# Patient Record
Sex: FEMALE | Race: WHITE | Employment: UNEMPLOYED
[De-identification: names, ages, dates, MRNs, and addresses within clinical notes are randomized per-mention and may not be internally consistent; named-entity substitution may affect disease eponyms.]

---

## 2022-07-20 ENCOUNTER — OFFICE VISIT (OUTPATIENT)
Dept: FAMILY MEDICINE CLINIC | Facility: CLINIC | Age: 67
End: 2022-07-20
Payer: COMMERCIAL

## 2022-07-20 VITALS
SYSTOLIC BLOOD PRESSURE: 118 MMHG | HEART RATE: 98 BPM | WEIGHT: 173 LBS | OXYGEN SATURATION: 98 % | DIASTOLIC BLOOD PRESSURE: 78 MMHG | HEIGHT: 65 IN | BODY MASS INDEX: 28.82 KG/M2

## 2022-07-20 DIAGNOSIS — Z13.6 SCREENING FOR HEART DISEASE: ICD-10-CM

## 2022-07-20 DIAGNOSIS — Z12.31 ENCOUNTER FOR SCREENING MAMMOGRAM FOR MALIGNANT NEOPLASM OF BREAST: ICD-10-CM

## 2022-07-20 DIAGNOSIS — H61.21 IMPACTED CERUMEN OF RIGHT EAR: ICD-10-CM

## 2022-07-20 DIAGNOSIS — Z85.89 HISTORY OF SQUAMOUS CELL CARCINOMA: ICD-10-CM

## 2022-07-20 DIAGNOSIS — Z76.89 ENCOUNTER TO ESTABLISH CARE: ICD-10-CM

## 2022-07-20 DIAGNOSIS — Z00.00 ANNUAL PHYSICAL EXAM: ICD-10-CM

## 2022-07-20 DIAGNOSIS — Z85.828 HISTORY OF BASAL CELL CARCINOMA OF SKIN: Primary | ICD-10-CM

## 2022-07-20 PROCEDURE — 99203 OFFICE O/P NEW LOW 30 MIN: CPT | Performed by: FAMILY MEDICINE

## 2022-07-20 ASSESSMENT — ENCOUNTER SYMPTOMS
CONSTIPATION: 0
EYE PAIN: 0
COUGH: 0
WHEEZING: 0
NAUSEA: 0
BLOOD IN STOOL: 0
SINUS PRESSURE: 0
RHINORRHEA: 0
VOMITING: 0
COLOR CHANGE: 0
EYE ITCHING: 0
BACK PAIN: 0
EYE DISCHARGE: 0
SORE THROAT: 0
FACIAL SWELLING: 0
EYE REDNESS: 0
SINUS PAIN: 0
CHEST TIGHTNESS: 0
ABDOMINAL DISTENTION: 0
DIARRHEA: 0
SHORTNESS OF BREATH: 0
STRIDOR: 0
ABDOMINAL PAIN: 0

## 2022-07-20 ASSESSMENT — PATIENT HEALTH QUESTIONNAIRE - PHQ9
SUM OF ALL RESPONSES TO PHQ QUESTIONS 1-9: 0
SUM OF ALL RESPONSES TO PHQ QUESTIONS 1-9: 0
SUM OF ALL RESPONSES TO PHQ9 QUESTIONS 1 & 2: 0
SUM OF ALL RESPONSES TO PHQ QUESTIONS 1-9: 0
1. LITTLE INTEREST OR PLEASURE IN DOING THINGS: 0
2. FEELING DOWN, DEPRESSED OR HOPELESS: 0
SUM OF ALL RESPONSES TO PHQ QUESTIONS 1-9: 0

## 2022-07-20 ASSESSMENT — LIFESTYLE VARIABLES
HOW MANY STANDARD DRINKS CONTAINING ALCOHOL DO YOU HAVE ON A TYPICAL DAY: 1 OR 2
HOW OFTEN DO YOU HAVE A DRINK CONTAINING ALCOHOL: MONTHLY OR LESS

## 2022-07-28 DIAGNOSIS — Z13.6 SCREENING FOR HEART DISEASE: ICD-10-CM

## 2022-07-28 DIAGNOSIS — Z00.00 ANNUAL PHYSICAL EXAM: ICD-10-CM

## 2022-07-28 LAB
ALBUMIN SERPL-MCNC: 3.8 G/DL (ref 3.2–4.6)
ALBUMIN/GLOB SERPL: 1.2 {RATIO} (ref 1.2–3.5)
ALP SERPL-CCNC: 77 U/L (ref 50–136)
ALT SERPL-CCNC: 48 U/L (ref 12–65)
ANION GAP SERPL CALC-SCNC: 7 MMOL/L (ref 7–16)
AST SERPL-CCNC: 34 U/L (ref 15–37)
BASOPHILS # BLD: 0.1 K/UL (ref 0–0.2)
BASOPHILS NFR BLD: 1 % (ref 0–2)
BILIRUB SERPL-MCNC: 0.6 MG/DL (ref 0.2–1.1)
BUN SERPL-MCNC: 11 MG/DL (ref 8–23)
CALCIUM SERPL-MCNC: 9.1 MG/DL (ref 8.3–10.4)
CHLORIDE SERPL-SCNC: 107 MMOL/L (ref 98–107)
CHOLEST SERPL-MCNC: 234 MG/DL
CO2 SERPL-SCNC: 27 MMOL/L (ref 21–32)
CREAT SERPL-MCNC: 0.7 MG/DL (ref 0.6–1)
DIFFERENTIAL METHOD BLD: ABNORMAL
EOSINOPHIL # BLD: 0.2 K/UL (ref 0–0.8)
EOSINOPHIL NFR BLD: 3 % (ref 0.5–7.8)
ERYTHROCYTE [DISTWIDTH] IN BLOOD BY AUTOMATED COUNT: 11.7 % (ref 11.9–14.6)
GLOBULIN SER CALC-MCNC: 3.2 G/DL (ref 2.3–3.5)
GLUCOSE SERPL-MCNC: 104 MG/DL (ref 65–100)
HCT VFR BLD AUTO: 39 % (ref 35.8–46.3)
HDLC SERPL-MCNC: 34 MG/DL (ref 40–60)
HDLC SERPL: 6.9 {RATIO}
HGB BLD-MCNC: 13.2 G/DL (ref 11.7–15.4)
IMM GRANULOCYTES # BLD AUTO: 0 K/UL (ref 0–0.5)
IMM GRANULOCYTES NFR BLD AUTO: 0 % (ref 0–5)
LDLC SERPL CALC-MCNC: ABNORMAL MG/DL
LYMPHOCYTES # BLD: 2.5 K/UL (ref 0.5–4.6)
LYMPHOCYTES NFR BLD: 42 % (ref 13–44)
MCH RBC QN AUTO: 31.1 PG (ref 26.1–32.9)
MCHC RBC AUTO-ENTMCNC: 33.8 G/DL (ref 31.4–35)
MCV RBC AUTO: 91.8 FL (ref 79.6–97.8)
MONOCYTES # BLD: 0.5 K/UL (ref 0.1–1.3)
MONOCYTES NFR BLD: 8 % (ref 4–12)
NEUTS SEG # BLD: 2.7 K/UL (ref 1.7–8.2)
NEUTS SEG NFR BLD: 46 % (ref 43–78)
NRBC # BLD: 0 K/UL (ref 0–0.2)
PLATELET # BLD AUTO: 313 K/UL (ref 150–450)
PMV BLD AUTO: 9.5 FL (ref 9.4–12.3)
POTASSIUM SERPL-SCNC: 4.1 MMOL/L (ref 3.5–5.1)
PROT SERPL-MCNC: 7 G/DL (ref 6.3–8.2)
RBC # BLD AUTO: 4.25 M/UL (ref 4.05–5.2)
SODIUM SERPL-SCNC: 141 MMOL/L (ref 136–145)
TRIGL SERPL-MCNC: 410 MG/DL (ref 35–150)
TSH, 3RD GENERATION: 1.29 UIU/ML (ref 0.36–3.74)
VLDLC SERPL CALC-MCNC: 82 MG/DL (ref 6–23)
WBC # BLD AUTO: 5.9 K/UL (ref 4.3–11.1)

## 2022-07-29 LAB — LDLC SERPL DIRECT ASSAY-MCNC: 140 MG/DL

## 2022-08-03 ENCOUNTER — OFFICE VISIT (OUTPATIENT)
Dept: FAMILY MEDICINE CLINIC | Facility: CLINIC | Age: 67
End: 2022-08-03
Payer: COMMERCIAL

## 2022-08-03 VITALS
HEIGHT: 65 IN | WEIGHT: 174 LBS | BODY MASS INDEX: 28.99 KG/M2 | SYSTOLIC BLOOD PRESSURE: 130 MMHG | DIASTOLIC BLOOD PRESSURE: 60 MMHG | HEART RATE: 99 BPM | OXYGEN SATURATION: 95 %

## 2022-08-03 DIAGNOSIS — Z00.00 ANNUAL PHYSICAL EXAM: Primary | ICD-10-CM

## 2022-08-03 DIAGNOSIS — Z12.31 ENCOUNTER FOR SCREENING MAMMOGRAM FOR MALIGNANT NEOPLASM OF BREAST: ICD-10-CM

## 2022-08-03 DIAGNOSIS — E78.5 DYSLIPIDEMIA (HIGH LDL; LOW HDL): ICD-10-CM

## 2022-08-03 DIAGNOSIS — R73.01 IMPAIRED FASTING GLUCOSE: ICD-10-CM

## 2022-08-03 LAB — HBA1C MFR BLD: 5.4 %

## 2022-08-03 PROCEDURE — 99396 PREV VISIT EST AGE 40-64: CPT | Performed by: FAMILY MEDICINE

## 2022-08-03 PROCEDURE — 83036 HEMOGLOBIN GLYCOSYLATED A1C: CPT | Performed by: FAMILY MEDICINE

## 2022-08-03 PROCEDURE — 99213 OFFICE O/P EST LOW 20 MIN: CPT | Performed by: FAMILY MEDICINE

## 2022-08-03 ASSESSMENT — ENCOUNTER SYMPTOMS
EYE DISCHARGE: 0
BLOOD IN STOOL: 0
ABDOMINAL DISTENTION: 0
SHORTNESS OF BREATH: 0
SINUS PAIN: 0
EYE PAIN: 0
ABDOMINAL PAIN: 0
WHEEZING: 0
COLOR CHANGE: 0
EYE REDNESS: 0
STRIDOR: 0
BACK PAIN: 0
SORE THROAT: 0
SINUS PRESSURE: 0
DIARRHEA: 0
RHINORRHEA: 0
NAUSEA: 0
VOMITING: 0
CONSTIPATION: 0
EYE ITCHING: 0
CHEST TIGHTNESS: 0
COUGH: 0
FACIAL SWELLING: 0

## 2022-08-03 NOTE — PROGRESS NOTES
[address] [place] [address]  _______________________________________  Georgie Gurrola MD                 [address] [place] [address],  [address]. [place], [address] [place] [address]                     Andrea Quintanilla [address]                                                                                    Phone: [phone]                                                                                    Fax: [phone]    Edilberto Couch is a 61 y.o. female who is seen for evaluation of   Chief Complaint   Patient presents with    Annual Exam     Declines breast/pelvic exam       HPI:   Pt is seen today for CPE. Her mammogram was ordered last visit and her last Cologuard was within 3 yrs in [address] [place] [address] Ave- negative. She has results at home. Pt's cholesterol is significantly elevated, which has been a longstanding problem. Tried on lipitor, but had significant LE myalgias. Mother had CAD. Review of Systems:  Review of Systems   Constitutional:  Negative for activity change, appetite change, chills, diaphoresis, fatigue, fever and unexpected weight change. HENT:  Negative for congestion, ear discharge, ear pain, facial swelling, hearing loss, mouth sores, nosebleeds, postnasal drip, rhinorrhea, sinus pressure, sinus pain, sore throat and tinnitus. Eyes:  Negative for pain, discharge, redness, itching and visual disturbance. Respiratory:  Negative for cough, chest tightness, shortness of breath, wheezing and stridor. Cardiovascular:  Negative for chest pain, palpitations and leg swelling. Gastrointestinal:  Negative for abdominal distention, abdominal pain, blood in stool, constipation, diarrhea, nausea and vomiting. Endocrine: Negative for cold intolerance, heat intolerance, polydipsia, polyphagia and polyuria. Genitourinary:  Negative for decreased urine volume, difficulty urinating, dysuria, flank pain, frequency, hematuria and urgency.    Musculoskeletal:  Negative for arthralgias, back pain, gait problem, joint swelling, myalgias and neck pain. Skin:  Negative for color change, pallor, rash and wound. Neurological:  Negative for dizziness, tremors, seizures, syncope, facial asymmetry, speech difficulty, weakness, light-headedness, numbness and headaches. Psychiatric/Behavioral:  Negative for agitation, behavioral problems, confusion, hallucinations, self-injury, sleep disturbance and suicidal ideas. The patient is not nervous/anxious. History:  History reviewed. No pertinent past medical history. Past Surgical History:   Procedure Laterality Date    LAPAROSCOPIC HYSTERECTOMY N/A 01/01/2003    OVARY REMOVAL Bilateral 01/01/2011    TONSILLECTOMY Bilateral 01/01/1963       Family History   Problem Relation Age of Onset    Liver Cancer Mother     High Cholesterol Mother     Prostate Cancer Father        Social History     Tobacco Use    Smoking status: Never    Smokeless tobacco: Never   Substance Use Topics    Alcohol use: Yes     Comment: rarely       No Known Allergies    No current outpatient medications on file. No current facility-administered medications for this visit.        Vitals:    /60 (Site: Left Upper Arm, Position: Sitting, Cuff Size: Small Adult)   Pulse 99   Ht 5' 5.25\" (1.657 m)   Wt 174 lb (78.9 kg)   SpO2 95%   BMI 28.73 kg/m²     Lab Results   Component Value Date    CHOL 234 (H) 07/28/2022     Lab Results   Component Value Date    TRIG 410 (H) 07/28/2022     Lab Results   Component Value Date    HDL 34 (L) 07/28/2022     Lab Results   Component Value Date    [facility] Not calculated due to elevated triglyceride level 07/28/2022     Lab Results   Component Value Date    LABVLDL 82 (H) 07/28/2022     Lab Results   Component Value Date    CHOLHDLRATIO 6.9 07/28/2022     Lab Results   Component Value Date    WBC 5.9 07/28/2022    HGB 13.2 07/28/2022    HCT 39.0 07/28/2022    MCV 91.8 07/28/2022     07/28/2022     Lab Results   Component Value Date     07/28/2022 K 4.1 07/28/2022     07/28/2022    CO2 27 07/28/2022    BUN 11 07/28/2022    CREATININE 0.70 07/28/2022    GLUCOSE 104 (H) 07/28/2022    CALCIUM 9.1 07/28/2022    PROT 7.0 07/28/2022    LABALBU 3.8 07/28/2022    BILITOT 0.6 07/28/2022    ALKPHOS 77 07/28/2022    AST 34 07/28/2022    ALT 48 07/28/2022    LABGLOM >60 07/28/2022    GFRAA >60 07/28/2022    GLOB 3.2 07/28/2022       Lab Results   Component Value Date    LDLCALC Not calculated due to elevated triglyceride level 07/28/2022    LDLDIRECT 140 (H) 07/28/2022     Results for orders placed or performed in visit on 08/03/22   AMB POC HEMOGLOBIN A1C   Result Value Ref Range    Hemoglobin A1C, POC 5.4 %         Physical Exam:  Physical Exam  Vitals reviewed. Constitutional:       General: She is not in acute distress. Appearance: Normal appearance. She is not ill-appearing, toxic-appearing or diaphoretic. HENT:      Head: Normocephalic and atraumatic. Right Ear: Tympanic membrane, ear canal and external ear normal. There is no impacted cerumen. Left Ear: Tympanic membrane, ear canal and external ear normal. There is no impacted cerumen. Nose: Nose normal. No congestion or rhinorrhea. Mouth/Throat:      Mouth: Mucous membranes are moist.      Pharynx: No oropharyngeal exudate or posterior oropharyngeal erythema. Eyes:      General: No scleral icterus. Right eye: No discharge. Left eye: No discharge. Extraocular Movements: Extraocular movements intact. Conjunctiva/sclera: Conjunctivae normal.      Pupils: Pupils are equal, round, and reactive to light. Cardiovascular:      Rate and Rhythm: Normal rate and regular rhythm. Pulses: Normal pulses. Heart sounds: Normal heart sounds. No murmur heard. No friction rub. No gallop. Pulmonary:      Effort: Pulmonary effort is normal. No respiratory distress. Breath sounds: Normal breath sounds. No stridor. No wheezing, rhonchi or rales. Chest:      Chest wall: No tenderness. Abdominal:      General: Abdomen is flat. Bowel sounds are normal. There is no distension. Palpations: Abdomen is soft. There is no mass. Tenderness: There is no abdominal tenderness. There is no right CVA tenderness, left CVA tenderness, guarding or rebound. Musculoskeletal:         General: No swelling, tenderness, deformity or signs of injury. Cervical back: Neck supple. No rigidity or tenderness. Right lower leg: No edema. Left lower leg: No edema. Lymphadenopathy:      Cervical: No cervical adenopathy. Skin:     General: Skin is warm and dry. Coloration: Skin is not jaundiced or pale. Findings: No bruising, erythema, lesion or rash. Neurological:      General: No focal deficit present. Mental Status: She is alert. Mental status is at baseline. Motor: No weakness. Coordination: Coordination normal.      Gait: Gait normal.   Psychiatric:         Mood and Affect: Mood normal.         Behavior: Behavior normal.         Thought Content: Thought content normal.         Judgment: Judgment normal.       Assessment/Plan:     ICD-10-CM    1. Annual physical exam  Z00.00       2. Dyslipidemia (high LDL; low HDL)  E78.5 Lipid Panel      3. Impaired fasting glucose  R73.01 AMB POC HEMOGLOBIN A1C     CANCELED: Hemoglobin A1C      4. Encounter for screening mammogram for malignant neoplasm of breast  Z12.31 CANCELED: SUELLEN Screening Bilateral           Problem List          Endocrine    Impaired fasting glucose      A1c good. Encouraged to control intake of sugar, bread, pasta, rice, potatoes, fruit, snack foods, desserts in diet. Relevant Orders    AMB POC HEMOGLOBIN A1C (Completed)       Other    Annual physical exam - Primary      Encouraged 5 servings of fruits and veggies daily. Instructed to drink approx 2 L of fluid daily, mostly water.  Recommended 30 sustained minutes of aerobic exercise daily (e.g. cycling,

## 2022-08-03 NOTE — ASSESSMENT & PLAN NOTE
Long discussion had with pt regarding cholesterol. ASCVD risk calculated 7.1%. Pt declines starting medication. Advised she will need aggressive lifestyle modifications, but may still require meds. She wishes to trial dietary changes for 6 months and recheck lipids.

## 2022-08-03 NOTE — ASSESSMENT & PLAN NOTE
Encouraged 5 servings of fruits and veggies daily. Instructed to drink approx 2 L of fluid daily, mostly water. Recommended 30 sustained minutes of aerobic exercise daily (e.g. cycling, rowing, jogging). Limit high calorie processed foods, red meat, egg yolks, dairy products, butter, garcia, fried and fast foods. Recommended influenza vaccination annually.

## 2022-09-02 PROBLEM — Z00.00 ANNUAL PHYSICAL EXAM: Status: RESOLVED | Noted: 2022-08-03 | Resolved: 2022-09-02

## 2025-03-27 ENCOUNTER — OFFICE VISIT (OUTPATIENT)
Dept: ENT CLINIC | Age: 70
End: 2025-03-27
Payer: MEDICARE

## 2025-03-27 ENCOUNTER — OFFICE VISIT (OUTPATIENT)
Age: 70
End: 2025-03-27
Payer: MEDICARE

## 2025-03-27 VITALS
HEART RATE: 84 BPM | WEIGHT: 179.6 LBS | HEIGHT: 66 IN | OXYGEN SATURATION: 97 % | RESPIRATION RATE: 17 BRPM | BODY MASS INDEX: 28.87 KG/M2

## 2025-03-27 DIAGNOSIS — H90.3 ASYMMETRIC SNHL (SENSORINEURAL HEARING LOSS): ICD-10-CM

## 2025-03-27 DIAGNOSIS — H81.10 BPPV (BENIGN PAROXYSMAL POSITIONAL VERTIGO), UNSPECIFIED LATERALITY: Primary | ICD-10-CM

## 2025-03-27 DIAGNOSIS — H90.3 SENSORINEURAL HEARING LOSS, BILATERAL: Primary | ICD-10-CM

## 2025-03-27 PROCEDURE — 1160F RVW MEDS BY RX/DR IN RCRD: CPT | Performed by: STUDENT IN AN ORGANIZED HEALTH CARE EDUCATION/TRAINING PROGRAM

## 2025-03-27 PROCEDURE — 99204 OFFICE O/P NEW MOD 45 MIN: CPT | Performed by: STUDENT IN AN ORGANIZED HEALTH CARE EDUCATION/TRAINING PROGRAM

## 2025-03-27 PROCEDURE — 1159F MED LIST DOCD IN RCRD: CPT | Performed by: STUDENT IN AN ORGANIZED HEALTH CARE EDUCATION/TRAINING PROGRAM

## 2025-03-27 PROCEDURE — 1123F ACP DISCUSS/DSCN MKR DOCD: CPT | Performed by: STUDENT IN AN ORGANIZED HEALTH CARE EDUCATION/TRAINING PROGRAM

## 2025-03-27 PROCEDURE — 92557 COMPREHENSIVE HEARING TEST: CPT | Performed by: AUDIOLOGIST

## 2025-03-27 PROCEDURE — 92567 TYMPANOMETRY: CPT | Performed by: AUDIOLOGIST

## 2025-03-27 RX ORDER — MECLIZINE HYDROCHLORIDE 25 MG/1
25 TABLET ORAL EVERY 12 HOURS PRN
COMMUNITY
Start: 2025-01-15

## 2025-03-27 RX ORDER — ASCORBIC ACID 1000 MG
TABLET ORAL
COMMUNITY

## 2025-03-27 ASSESSMENT — ENCOUNTER SYMPTOMS
STRIDOR: 0
SHORTNESS OF BREATH: 0
APNEA: 0
COUGH: 0
CHOKING: 0
CONSTIPATION: 0
EYE PAIN: 0
SINUS PAIN: 0
DIARRHEA: 0
EYE ITCHING: 0
SINUS PRESSURE: 0
EYE DISCHARGE: 0
WHEEZING: 0
FACIAL SWELLING: 0
NAUSEA: 0

## 2025-03-27 NOTE — PROGRESS NOTES
AUDIOLOGY EVALUATION    Antoinette Mckee had Tympanometry and Audiometry performed today.    The patient reports dizziness and hearing loss in her right ear.     Results as follows:    Tympanometry    Type A -  bilaterally    Audiometry    Test Performed - Comprehensive Audiogram    Type of Loss - Right Ear: abnormal hearing: degree of loss is mild to moderately severe sensorineural hearing loss                           Left Ear: abnormal hearing: degree of loss is normal to mild sensorineural hearing loss     SRT   Measurement Right Ear Left Ear   Value 35 20   Unit dB dB     Discrimination  Measurement Right Ear Left Ear   Value 92% 100%   Unit dB dB     Recommend  Further testing due to asymmetry and amplification following medical clearance    Lazarus Ramirez [facility]  Audiologist

## 2025-03-27 NOTE — PROGRESS NOTES
HPI:    Antoinette Mckee is a 69 y.o. female seen New    Chief Complaint   Patient presents with    Dizziness     Patient presents today with c/o frequent dizziness . She has used tumeric , timur extract , gingko biloba and states this has helped . She states that meclizine did not help .        History of Present Illness  69-year-old female presents for new patient evaluation with dizziness and right-sided hearing loss. Sudden onset of right-sided hearing loss in 2007, evaluated by a specialist with no identified cause. Tinnitus resolved with prayer, but hearing did not return. Occasional popping sensation in ears, excessive earwax requiring annual flushing. Functional until 05/2024, when severe dizziness occurred after a swing ride, bedridden for a week. Similar episode in 12/2024 after a slow ride. 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 Discontinued medication, improved with ginkgo, turmeric, and timur. Dizziness persists with prolonged bending, improves with rest. Balance better in morning, deteriorates throughout day. Avoids fast movements and movies due to lighting concerns. Recent eye exam showed early cataracts. No brain imaging since 2007 head MRI for sinus issues. Describes hearing as if cork in ear, suspects eustachian tube closure. Continues excessive earwax, requires flushing. Takes timur extract with breakfast.    FAMILY HISTORY  Mother had excessive earwax.    MEDICATIONS  Current: ginkgo, turmeric, timur extract        Past Medical History, Past Surgical History, Family history, Social History, and Medications were all reviewed with the patient today and updated as necessary.     Allergies   Allergen Reactions    Prednisone      Other Reaction(s): Other- (not listed) - Allergy    Rapid heart rate/palpitations       Patient Active Problem List   Diagnosis    History of basal cell carcinoma of skin